# Patient Record
Sex: MALE | Race: WHITE | Employment: FULL TIME | URBAN - METROPOLITAN AREA
[De-identification: names, ages, dates, MRNs, and addresses within clinical notes are randomized per-mention and may not be internally consistent; named-entity substitution may affect disease eponyms.]

---

## 2017-07-08 ENCOUNTER — HOSPITAL ENCOUNTER (OUTPATIENT)
Age: 67
Setting detail: OBSERVATION
Discharge: HOME | End: 2017-07-10
Attending: FAMILY MEDICINE | Admitting: HOSPITALIST
Payer: MEDICARE

## 2017-07-08 ENCOUNTER — APPOINTMENT (OUTPATIENT)
Dept: GENERAL RADIOLOGY | Age: 67
End: 2017-07-08
Attending: FAMILY MEDICINE
Payer: MEDICARE

## 2017-07-08 DIAGNOSIS — R07.9 ACUTE CHEST PAIN: Primary | ICD-10-CM

## 2017-07-08 PROBLEM — I25.10 CAD (CORONARY ARTERY DISEASE): Status: ACTIVE | Noted: 2017-07-08

## 2017-07-08 PROBLEM — R19.7 DIARRHEA: Status: ACTIVE | Noted: 2017-07-08

## 2017-07-08 PROBLEM — I10 HTN (HYPERTENSION): Status: ACTIVE | Noted: 2017-07-08

## 2017-07-08 PROBLEM — F41.9 ANXIETY: Status: ACTIVE | Noted: 2017-07-08

## 2017-07-08 PROBLEM — R74.8 ELEVATED CREATINE KINASE: Status: ACTIVE | Noted: 2017-07-08

## 2017-07-08 LAB
ALBUMIN SERPL BCP-MCNC: 4.5 G/DL (ref 3.4–5)
ALBUMIN/GLOB SERPL: 1.4 {RATIO} (ref 0.8–1.7)
ALP SERPL-CCNC: 67 U/L (ref 45–117)
ALT SERPL-CCNC: 26 U/L (ref 16–61)
ANION GAP BLD CALC-SCNC: 16 MMOL/L (ref 3–18)
AST SERPL W P-5'-P-CCNC: 23 U/L (ref 15–37)
BASOPHILS # BLD AUTO: 0.1 K/UL (ref 0–0.06)
BASOPHILS # BLD: 0 % (ref 0–2)
BILIRUB SERPL-MCNC: 1 MG/DL (ref 0.2–1)
BUN SERPL-MCNC: 8 MG/DL (ref 7–18)
BUN/CREAT SERPL: 6 (ref 12–20)
CALCIUM SERPL-MCNC: 9.4 MG/DL (ref 8.5–10.1)
CHLORIDE SERPL-SCNC: 95 MMOL/L (ref 100–108)
CK MB CFR SERPL CALC: 0.9 % (ref 0–4)
CK MB SERPL-MCNC: 2.4 NG/ML (ref 5–25)
CK SERPL-CCNC: 275 U/L (ref 39–308)
CO2 SERPL-SCNC: 24 MMOL/L (ref 21–32)
CREAT SERPL-MCNC: 1.32 MG/DL (ref 0.6–1.3)
DIFFERENTIAL METHOD BLD: ABNORMAL
EOSINOPHIL # BLD: 0.1 K/UL (ref 0–0.4)
EOSINOPHIL NFR BLD: 1 % (ref 0–5)
ERYTHROCYTE [DISTWIDTH] IN BLOOD BY AUTOMATED COUNT: 13 % (ref 11.6–14.5)
GLOBULIN SER CALC-MCNC: 3.2 G/DL (ref 2–4)
GLUCOSE SERPL-MCNC: 120 MG/DL (ref 74–99)
HCT VFR BLD AUTO: 46 % (ref 36–48)
HGB BLD-MCNC: 16.7 G/DL (ref 13–16)
LIPASE SERPL-CCNC: 180 U/L (ref 73–393)
LYMPHOCYTES # BLD AUTO: 20 % (ref 21–52)
LYMPHOCYTES # BLD: 2.3 K/UL (ref 0.9–3.6)
MAGNESIUM SERPL-MCNC: 1.6 MG/DL (ref 1.6–2.6)
MCH RBC QN AUTO: 32.1 PG (ref 24–34)
MCHC RBC AUTO-ENTMCNC: 36.3 G/DL (ref 31–37)
MCV RBC AUTO: 88.3 FL (ref 74–97)
MONOCYTES # BLD: 0.9 K/UL (ref 0.05–1.2)
MONOCYTES NFR BLD AUTO: 8 % (ref 3–10)
NEUTS SEG # BLD: 7.9 K/UL (ref 1.8–8)
NEUTS SEG NFR BLD AUTO: 71 % (ref 40–73)
PLATELET # BLD AUTO: 274 K/UL (ref 135–420)
PMV BLD AUTO: 10.1 FL (ref 9.2–11.8)
POTASSIUM SERPL-SCNC: 3.5 MMOL/L (ref 3.5–5.5)
PROT SERPL-MCNC: 7.7 G/DL (ref 6.4–8.2)
RBC # BLD AUTO: 5.21 M/UL (ref 4.7–5.5)
SODIUM SERPL-SCNC: 135 MMOL/L (ref 136–145)
TROPONIN I SERPL-MCNC: <0.02 NG/ML (ref 0–0.06)
WBC # BLD AUTO: 11.3 K/UL (ref 4.6–13.2)

## 2017-07-08 PROCEDURE — 74011250637 HC RX REV CODE- 250/637: Performed by: HOSPITALIST

## 2017-07-08 PROCEDURE — 83690 ASSAY OF LIPASE: CPT | Performed by: FAMILY MEDICINE

## 2017-07-08 PROCEDURE — 96375 TX/PRO/DX INJ NEW DRUG ADDON: CPT

## 2017-07-08 PROCEDURE — 93005 ELECTROCARDIOGRAM TRACING: CPT

## 2017-07-08 PROCEDURE — 74011250636 HC RX REV CODE- 250/636: Performed by: FAMILY MEDICINE

## 2017-07-08 PROCEDURE — 99285 EMERGENCY DEPT VISIT HI MDM: CPT

## 2017-07-08 PROCEDURE — 85025 COMPLETE CBC W/AUTO DIFF WBC: CPT | Performed by: FAMILY MEDICINE

## 2017-07-08 PROCEDURE — 99218 HC RM OBSERVATION: CPT

## 2017-07-08 PROCEDURE — 74011250636 HC RX REV CODE- 250/636: Performed by: HOSPITALIST

## 2017-07-08 PROCEDURE — 83735 ASSAY OF MAGNESIUM: CPT | Performed by: FAMILY MEDICINE

## 2017-07-08 PROCEDURE — 80053 COMPREHEN METABOLIC PANEL: CPT | Performed by: FAMILY MEDICINE

## 2017-07-08 PROCEDURE — 96365 THER/PROPH/DIAG IV INF INIT: CPT

## 2017-07-08 PROCEDURE — 71010 XR CHEST PORT: CPT

## 2017-07-08 PROCEDURE — 96372 THER/PROPH/DIAG INJ SC/IM: CPT

## 2017-07-08 PROCEDURE — 82550 ASSAY OF CK (CPK): CPT | Performed by: FAMILY MEDICINE

## 2017-07-08 PROCEDURE — 96374 THER/PROPH/DIAG INJ IV PUSH: CPT

## 2017-07-08 RX ORDER — DIPHENHYDRAMINE HYDROCHLORIDE 50 MG/ML
12.5 INJECTION, SOLUTION INTRAMUSCULAR; INTRAVENOUS
Status: DISCONTINUED | OUTPATIENT
Start: 2017-07-08 | End: 2017-07-10 | Stop reason: HOSPADM

## 2017-07-08 RX ORDER — ROPINIROLE 0.25 MG/1
0.5 TABLET, FILM COATED ORAL
Status: DISCONTINUED | OUTPATIENT
Start: 2017-07-08 | End: 2017-07-10 | Stop reason: HOSPADM

## 2017-07-08 RX ORDER — MAGNESIUM SULFATE HEPTAHYDRATE 40 MG/ML
2 INJECTION, SOLUTION INTRAVENOUS ONCE
Status: COMPLETED | OUTPATIENT
Start: 2017-07-08 | End: 2017-07-08

## 2017-07-08 RX ORDER — SODIUM CHLORIDE 9 MG/ML
75 INJECTION, SOLUTION INTRAVENOUS CONTINUOUS
Status: DISCONTINUED | OUTPATIENT
Start: 2017-07-08 | End: 2017-07-10 | Stop reason: HOSPADM

## 2017-07-08 RX ORDER — ENOXAPARIN SODIUM 100 MG/ML
40 INJECTION SUBCUTANEOUS EVERY 24 HOURS
Status: DISCONTINUED | OUTPATIENT
Start: 2017-07-08 | End: 2017-07-10 | Stop reason: HOSPADM

## 2017-07-08 RX ORDER — TAMSULOSIN HYDROCHLORIDE 0.4 MG/1
0.4 CAPSULE ORAL DAILY
Status: DISCONTINUED | OUTPATIENT
Start: 2017-07-09 | End: 2017-07-10 | Stop reason: HOSPADM

## 2017-07-08 RX ORDER — METOPROLOL SUCCINATE 25 MG/1
25 TABLET, EXTENDED RELEASE ORAL DAILY
COMMUNITY

## 2017-07-08 RX ORDER — MORPHINE SULFATE 2 MG/ML
1 INJECTION, SOLUTION INTRAMUSCULAR; INTRAVENOUS
Status: DISCONTINUED | OUTPATIENT
Start: 2017-07-08 | End: 2017-07-10 | Stop reason: HOSPADM

## 2017-07-08 RX ORDER — ESCITALOPRAM OXALATE 10 MG/1
10 TABLET ORAL DAILY
COMMUNITY

## 2017-07-08 RX ORDER — ESCITALOPRAM OXALATE 10 MG/1
10 TABLET ORAL DAILY
Status: DISCONTINUED | OUTPATIENT
Start: 2017-07-09 | End: 2017-07-10 | Stop reason: HOSPADM

## 2017-07-08 RX ORDER — LORAZEPAM 2 MG/ML
1 INJECTION INTRAMUSCULAR ONCE
Status: COMPLETED | OUTPATIENT
Start: 2017-07-08 | End: 2017-07-08

## 2017-07-08 RX ORDER — CLOPIDOGREL BISULFATE 75 MG/1
75 TABLET ORAL 3 TIMES WEEKLY
Status: DISCONTINUED | OUTPATIENT
Start: 2017-07-10 | End: 2017-07-10 | Stop reason: HOSPADM

## 2017-07-08 RX ORDER — NITROGLYCERIN 0.4 MG/1
0.4 TABLET SUBLINGUAL AS NEEDED
Status: DISCONTINUED | OUTPATIENT
Start: 2017-07-08 | End: 2017-07-10 | Stop reason: HOSPADM

## 2017-07-08 RX ORDER — MULTIVITAMIN WITH IRON
1 TABLET ORAL DAILY
COMMUNITY

## 2017-07-08 RX ORDER — ROPINIROLE 0.5 MG/1
0.5 TABLET, FILM COATED ORAL
COMMUNITY

## 2017-07-08 RX ORDER — NALOXONE HYDROCHLORIDE 0.4 MG/ML
0.4 INJECTION, SOLUTION INTRAMUSCULAR; INTRAVENOUS; SUBCUTANEOUS AS NEEDED
Status: DISCONTINUED | OUTPATIENT
Start: 2017-07-08 | End: 2017-07-10 | Stop reason: HOSPADM

## 2017-07-08 RX ORDER — ATORVASTATIN CALCIUM 10 MG/1
10 TABLET, FILM COATED ORAL DAILY
Status: DISCONTINUED | OUTPATIENT
Start: 2017-07-09 | End: 2017-07-10 | Stop reason: HOSPADM

## 2017-07-08 RX ORDER — ONDANSETRON 2 MG/ML
4 INJECTION INTRAMUSCULAR; INTRAVENOUS
Status: DISCONTINUED | OUTPATIENT
Start: 2017-07-08 | End: 2017-07-10 | Stop reason: HOSPADM

## 2017-07-08 RX ORDER — TAMSULOSIN HYDROCHLORIDE 0.4 MG/1
0.4 CAPSULE ORAL DAILY
COMMUNITY

## 2017-07-08 RX ORDER — CLOPIDOGREL BISULFATE 75 MG/1
75 TABLET ORAL 3 TIMES WEEKLY
COMMUNITY

## 2017-07-08 RX ORDER — ONDANSETRON 2 MG/ML
4 INJECTION INTRAMUSCULAR; INTRAVENOUS ONCE
Status: COMPLETED | OUTPATIENT
Start: 2017-07-08 | End: 2017-07-08

## 2017-07-08 RX ORDER — ASPIRIN 81 MG/1
81 TABLET ORAL DAILY
Status: DISCONTINUED | OUTPATIENT
Start: 2017-07-09 | End: 2017-07-10 | Stop reason: HOSPADM

## 2017-07-08 RX ORDER — METOPROLOL SUCCINATE 25 MG/1
25 TABLET, EXTENDED RELEASE ORAL DAILY
Status: DISCONTINUED | OUTPATIENT
Start: 2017-07-09 | End: 2017-07-10 | Stop reason: HOSPADM

## 2017-07-08 RX ORDER — ACETAMINOPHEN 325 MG/1
650 TABLET ORAL
Status: DISCONTINUED | OUTPATIENT
Start: 2017-07-08 | End: 2017-07-10 | Stop reason: HOSPADM

## 2017-07-08 RX ADMIN — MAGNESIUM SULFATE HEPTAHYDRATE 2 G: 40 INJECTION, SOLUTION INTRAVENOUS at 21:25

## 2017-07-08 RX ADMIN — SODIUM CHLORIDE 75 ML/HR: 900 INJECTION, SOLUTION INTRAVENOUS at 21:24

## 2017-07-08 RX ADMIN — ROPINIROLE HYDROCHLORIDE 0.5 MG: 0.25 TABLET, FILM COATED ORAL at 23:43

## 2017-07-08 RX ADMIN — ONDANSETRON 4 MG: 2 INJECTION INTRAMUSCULAR; INTRAVENOUS at 19:15

## 2017-07-08 RX ADMIN — SODIUM CHLORIDE 1000 ML: 900 INJECTION, SOLUTION INTRAVENOUS at 19:15

## 2017-07-08 RX ADMIN — LORAZEPAM 1 MG: 2 INJECTION INTRAMUSCULAR; INTRAVENOUS at 19:15

## 2017-07-08 RX ADMIN — ENOXAPARIN SODIUM 40 MG: 40 INJECTION SUBCUTANEOUS at 21:25

## 2017-07-08 NOTE — IP AVS SNAPSHOT
[address] [place] [id number] 
[phone] Patient: Cha Herrera Sr. MRN: [id number] MMO:[birth year] You are allergic to the following No active allergies Recent Documentation Height Weight BMI Smoking Status 1.727 m 62.7 kg 21.03 kg/m2 Never Assessed Emergency Contacts Name Discharge Info Relation Home Work Mobile Emily Oreilly DISCHARGE CAREGIVER [3] Child [2]   [phone] Isadora Broussard DISCHARGE CAREGIVER [3] Child [2]   [phone] About your hospitalization You were admitted on:  July 8, 2017 You last received care in the:  [address] [place] [address] You were discharged on:  July 10, 2017 Unit phone number:  [phone] Why you were hospitalized Your primary diagnosis was:  Acute Chest Pain Your diagnoses also included:  Diarrhea, Cad (Coronary Artery Disease), Anxiety, Htn (Hypertension), Elevated Creatine Kinase Providers Seen During Your Hospitalizations Provider Role Specialty Primary office phone Melany Srivastava MD Attending Provider Emergency Medicine [phone] Chandan Calderon MD Attending Provider Internal Medicine [phone] Kay Vergara MD Attending Provider [facility] [phone] Your Primary Care Physician (PCP) Primary Care Physician Office Phone Office Fax Faviola Ruanoumber [phone] Follow-up Information Follow up With Details Comments Contact Info Colt Mcclellan MD On 7/24/2017 Follow-up appointment @ 11:15 a.m. [address] [facility] 150 
[phone] Current Discharge Medication List  
  
CONTINUE these medications which have NOT CHANGED Dose & Instructions Dispensing Information Comments Morning Noon Evening Bedtime DAILY MULTI-VITAMINS/IRON tablet Generic drug:  multivitamin with iron Your last dose was: Your next dose is:    
   
   
 Dose:  1 Tab Take 1 Tab by mouth daily. Refills:  0  
     
   
   
   
  
 FLOMAX 0.4 mg capsule Generic drug:  tamsulosin Your last dose was: Your next dose is:    
   
   
 Dose:  0.4 mg Take 0.4 mg by mouth daily. Refills:  0 LEXAPRO 10 mg tablet Generic drug:  escitalopram oxalate Your last dose was: Your next dose is:    
   
   
 Dose:  10 mg Take 10 mg by mouth daily. Refills:  0  
     
   
   
   
  
 omeprazole 20 mg capsule Commonly known as:  PRILOSEC Your last dose was: Your next dose is:    
   
   
 Dose:  20 mg Take 20 mg by mouth daily. Refills:  0 PLAVIX 75 mg Tab Generic drug:  clopidogrel Your last dose was: Your next dose is:    
   
   
 Dose:  75 mg Take 75 mg by mouth Three (3) times a week. Refills:  0  
     
   
   
   
  
 rOPINIRole 0.5 mg tablet Commonly known as:  Lavena Concepcion Your last dose was: Your next dose is:    
   
   
 Dose:  0.5 mg Take 0.5 mg by mouth nightly. Refills:  0  
     
   
   
   
  
 TOPROL XL 25 mg XL tablet Generic drug:  metoprolol succinate Your last dose was: Your next dose is:    
   
   
 Dose:  25 mg Take 25 mg by mouth daily. Refills:  0 Discharge Instructions DISCHARGE SUMMARY from Nurse The following personal items are in your possession at time of discharge: 
 
Dental Appliances: None Visual Aid: None Home Medications: None Jewelry: Earrings (2 white metal earrings) Clothing: Undergarments, With patient, Footwear, Pants, Shirt Other Valuables: None Personal Items Sent to Safe: none PATIENT INSTRUCTIONS: 
 
After general anesthesia or intravenous sedation, for 24 hours or while taking prescription Narcotics: · Limit your activities · Do not drive and operate hazardous machinery · Do not make important personal or business decisions · Do  not drink alcoholic beverages · If you have not urinated within 8 hours after discharge, please contact your surgeon on call. Report the following to your surgeon: 
· Excessive pain, swelling, redness or odor of or around the surgical area · Temperature over 100.5 · Nausea and vomiting lasting longer than 4 hours or if unable to take medications · Any signs of decreased circulation or nerve impairment to extremity: change in color, persistent  numbness, tingling, coldness or increase pain · Any questions What to do at Home: 
Recommended activity: Activity as tolerated. Please follow up with primary care provider and cardiologist. 
 
 
*  Please give a list of your current medications to your Primary Care Provider. *  Please update this list whenever your medications are discontinued, doses are 
    changed, or new medications (including over-the-counter products) are added. *  Please carry medication information at all times in case of emergency situations. These are general instructions for a healthy lifestyle: No smoking/ No tobacco products/ Avoid exposure to second hand smoke Surgeon General's Warning:  Quitting smoking now greatly reduces serious risk to your health. Obesity, smoking, and sedentary lifestyle greatly increases your risk for illness A healthy diet, regular physical exercise & weight monitoring are important for maintaining a healthy lifestyle You may be retaining fluid if you have a history of heart failure or if you experience any of the following symptoms:  Weight gain of 3 pounds or more overnight or 5 pounds in a week, increased swelling in our hands or feet or shortness of breath while lying flat in bed. Please call your doctor as soon as you notice any of these symptoms; do not wait until your next office visit. Recognize signs and symptoms of STROKE: 
 
F-face looks uneven A-arms unable to move or move unevenly S-speech slurred or non-existent T-time-call 911 as soon as signs and symptoms begin-DO NOT go Back to bed or wait to see if you get better-TIME IS BRAIN. Warning Signs of HEART ATTACK Call 911 if you have these symptoms: 
? Chest discomfort. Most heart attacks involve discomfort in the center of the chest that lasts more than a few minutes, or that goes away and comes back. It can feel like uncomfortable pressure, squeezing, fullness, or pain. ? Discomfort in other areas of the upper body. Symptoms can include pain or discomfort in one or both arms, the back, neck, jaw, or stomach. ? Shortness of breath with or without chest discomfort. ? Other signs may include breaking out in a cold sweat, nausea, or lightheadedness. Don't wait more than five minutes to call [address]! Fast action can save your life. Calling 911 is almost always the fastest way to get lifesaving treatment. Emergency Medical Services staff can begin treatment when they arrive  up to an hour sooner than if someone gets to the hospital by car. The discharge information has been reviewed with the patient and caregiver. The patient and caregiver verbalized understanding. Discharge medications reviewed with the patient and guardian and appropriate educational materials and side effects teaching were provided. Patient armband removed and shredded Discharge Instructions Attachments/References ANXIETY DISORDER (ENGLISH) CHEST PAIN (ENGLISH) Discharge Orders None [facility] [place] Announcement We are excited to announce that we are making your provider's discharge notes available to you in Eye Phone. You will see these notes when they are completed and signed by the physician that discharged you from your recent hospital stay.   If you have any questions or concerns about any information you see in Eye Phone, please call the [facility] [employer] Department where you were seen or reach out to your Primary Care Provider for more information about your plan of care. Introducing [facility]! [facility] introduces Rivalry patient portal. Now you can access parts of your medical record, email your doctor's office, and request medication refills online. 1. In your internet browser, go to [email or web address] 2. Click on the First Time User? Click Here link in the Sign In box. You will see the New Member Sign Up page. 3. Enter your Rivalry Access Code exactly as it appears below. You will not need to use this code after youve completed the sign-up process. If you do not sign up before the expiration date, you must request a new code. · Rivalry Access Code: [email or web address] Expires: 10/6/2017  7:10 PM 
 
4. Enter the last four digits of your Social Security Number (xxxx) and Date of Birth (mm/dd/yyyy) as indicated and click Submit. You will be taken to the next sign-up page. 5. Create a Rivalry ID. This will be your Rivalry login ID and cannot be changed, so think of one that is secure and easy to remember. 6. Create a Rivalry password. You can change your password at any time. 7. Enter your Password Reset Question and Answer. This can be used at a later time if you forget your password. 8. Enter your e-mail address. You will receive e-mail notification when new information is available in [address] 9. Click Sign Up. You can now view and download portions of your medical record. 10. Click the Download Summary menu link to download a portable copy of your medical information. If you have questions, please visit the Frequently Asked Questions section of the Rivalry website. Remember, Rivalry is NOT to be used for urgent needs. For medical emergencies, dial 911. Now available from your iPhone and Android! General Information Please provide this summary of care documentation to your next provider. Patient Signature:  ____________________________________________________________ Date:  ____________________________________________________________  
  
Gwendloyn Finger Provider Signature:  ____________________________________________________________ Date:  ____________________________________________________________ More Information Anxiety Disorder: Care Instructions Your Care Instructions Anxiety is a normal reaction to stress. Difficult situations can cause you to have symptoms such as sweaty palms and a nervous feeling. In an anxiety disorder, the symptoms are far more severe. Constant worry, muscle tension, trouble sleeping, nausea and diarrhea, and other symptoms can make normal daily activities difficult or impossible. These symptoms may occur for no reason, and they can affect your work, school, or social life. Medicines, counseling, and self-care can all help. Follow-up care is a key part of your treatment and safety. Be sure to make and go to all appointments, and call your doctor if you are having problems. It's also a good idea to know your test results and keep a list of the medicines you take. How can you care for yourself at home? · Take medicines exactly as directed. Call your doctor if you think you are having a problem with your medicine. · Go to your counseling sessions and follow-up appointments. · Recognize and accept your anxiety. Then, when you are in a situation that makes you anxious, say to yourself, \"This is not an emergency. I feel uncomfortable, but I am not in danger. I can keep going even if I feel anxious. \" · Be kind to your body: ¨ Relieve tension with exercise or a massage. ¨ Get enough rest. 
¨ Avoid alcohol, caffeine, nicotine, and illegal drugs. They can increase your anxiety level and cause sleep problems. ¨ Learn and do relaxation techniques. See below for more about these techniques. · Engage your mind. Get out and do something you enjoy. Go to a funny movie, or take a walk or hike. Plan your day. Having too much or too little to do can make you anxious. · Keep a record of your symptoms. Discuss your fears with a good friend or family member, or join a support group for people with similar problems. Talking to others sometimes relieves stress. · Get involved in social groups, or volunteer to help others. Being alone sometimes makes things seem worse than they are. · Get at least 30 minutes of exercise on most days of the week to relieve stress. Walking is a good choice. You also may want to do other activities, such as running, swimming, cycling, or playing tennis or team sports. Relaxation techniques Do relaxation exercises 10 to 20 minutes a day. You can play soothing, relaxing music while you do them, if you wish. · Tell others in your house that you are going to do your relaxation exercises. Ask them not to disturb you. · Find a comfortable place, away from all distractions and noise. · Lie down on your back, or sit with your back straight. · Focus on your breathing. Make it slow and steady. · Breathe in through your nose. Breathe out through either your nose or mouth. · Breathe deeply, filling up the area between your navel and your rib cage. Breathe so that your belly goes up and down. · Do not hold your breath. · Breathe like this for 5 to 10 minutes. Notice the feeling of calmness throughout your whole body. As you continue to breathe slowly and deeply, relax by doing the following for another 5 to 10 minutes: · Tighten and relax each muscle group in your body. You can begin at your toes and work your way up to your head. · Imagine your muscle groups relaxing and becoming heavy. · Empty your mind of all thoughts. · Let yourself relax more and more deeply. · Become aware of the state of calmness that surrounds you. · When your relaxation time is over, you can bring yourself back to alertness by moving your fingers and toes and then your hands and feet and then stretching and moving your entire body. Sometimes people fall asleep during relaxation, but they usually wake up shortly afterward. · Always give yourself time to return to full alertness before you drive a car or do anything that might cause an accident if you are not fully alert. Never play a relaxation tape while you drive a car. When should you call for help? Call 911 anytime you think you may need emergency care. For example, call if: 
· You feel you cannot stop from hurting yourself or someone else. Keep the numbers for these national suicide hotlines: 3-610-161-TALK (4-348.955.6825) and 5-515-RVUQBDG (2-690.190.5839). If you or someone you know talks about suicide or feeling hopeless, get help right away. Watch closely for changes in your health, and be sure to contact your doctor if: 
· You have anxiety or fear that affects your life. · You have symptoms of anxiety that are new or different from those you had before. Where can you learn more? Go to http://xavier-cammy.info/. Enter P754 in the search box to learn more about \"Anxiety Disorder: Care Instructions. \" Current as of: July 26, 2016 Content Version: 11.3 © 2701-3944 [employer]. Care instructions adapted under license by [employer] (which disclaims liability or warranty for this information). If you have questions about a medical condition or this instruction, always ask your healthcare professional. [place] [address] any warranty or liability for your use of this information. Chest Pain: Care Instructions Your Care Instructions There are many things that can cause chest pain.  Some are not serious and will get better on their own in a few days. But some kinds of chest pain need more testing and treatment. Your doctor may have recommended a follow-up visit in the next 8 to 12 hours. If you are not getting better, you may need more tests or treatment. Even though your doctor has released you, you still need to watch for any problems. The doctor carefully checked you, but sometimes problems can develop later. If you have new symptoms or if your symptoms do not get better, get medical care right away. If you have worse or different chest pain or pressure that lasts more than 5 minutes or you passed out (lost consciousness), call 911 or seek other emergency help right away. A medical visit is only one step in your treatment. Even if you feel better, you still need to do what your doctor recommends, such as going to all suggested follow-up appointments and taking medicines exactly as directed. This will help you recover and help prevent future problems. How can you care for yourself at home? · Rest until you feel better. · Take your medicine exactly as prescribed. Call your doctor if you think you are having a problem with your medicine. · Do not drive after taking a prescription pain medicine. When should you call for help? Call 911 if: 
· You passed out (lost consciousness). · You have severe difficulty breathing. · You have symptoms of a heart attack. These may include: ¨ Chest pain or pressure, or a strange feeling in your chest. 
¨ Sweating. ¨ Shortness of breath. ¨ Nausea or vomiting. ¨ Pain, pressure, or a strange feeling in your back, neck, jaw, or upper belly or in one or both shoulders or arms. ¨ Lightheadedness or sudden weakness. ¨ A fast or irregular heartbeat. After you call 911, the  may tell you to chew 1 adult-strength or 2 to 4 low-dose aspirin. Wait for an ambulance. Do not try to drive yourself. Call your doctor today if: 
· You have any trouble breathing. · Your chest pain gets worse. · You are dizzy or lightheaded, or you feel like you may faint. · You are not getting better as expected. · You are having new or different chest pain. Where can you learn more? Go to http://xavier-cammy.info/. Enter A120 in the search box to learn more about \"Chest Pain: Care Instructions. \" Current as of: March 20, 2017 Content Version: 11.3 © 2602-8525 [employer]. Care instructions adapted under license by [employer] (which disclaims liability or warranty for this information). If you have questions about a medical condition or this instruction, always ask your healthcare professional. [address] any warranty or liability for your use of this information.

## 2017-07-08 NOTE — ED PROVIDER NOTES
[address]  EMERGENCY DEPARTMENT HISTORY AND PHYSICAL EXAM       Date: 7/8/2017   Patient Name: Dunia Renee. YOB: 1950  Medical Record Number: [id number]    History of Presenting Illness     Chief Complaint   Patient presents with    Chest Pain        History Provided By:  Patient and EMS    Additional History:   6:10 PM  Dunia Breen is a 77 y.o. male with PMHX anxiety and MI (7 years ago) presenting to the ED via EMS C/O gradually worsening pressure-like nonradiating sternal CP, rated 9/10 at onset and rated 5/10 in the ED, onset 20 minutes ago while lying in bed. Took 2 Nitro with minimal relief. Associated sxs include n/v. Pt states his sxs are not similar to his previous MI sxs and that his pain today is worse. Per EMS, pt was given 2 Nitro and 324 mg of ASA with good relief of pain. Initial /80. Currently on Toprol, Plavix, Lexapro, Flomax. Pt denies SOB, abd pain, and any other sxs or complaints. Primary Care Provider: Tristen Romano MD   Specialist:    Past History     Past Medical History:   Past Medical History:   Diagnosis Date    Anxiety     CAD (coronary artery disease)     MI 2010    Hypercholesterolemia     Psychiatric disorder     anxiety        Past Surgical History:   History reviewed. No pertinent surgical history. Family History:   History reviewed. No pertinent family history. Social History:   Social History   Substance Use Topics    Smoking status: None    Smokeless tobacco: None    Alcohol use None        Allergies:   No Known Allergies     Review of Systems   Review of Systems   Constitutional: Negative for fatigue and fever. HENT: Negative for rhinorrhea and sore throat. Respiratory: Negative for cough and shortness of breath. Cardiovascular: Positive for chest pain. Negative for palpitations. Gastrointestinal: Positive for nausea and vomiting. Negative for abdominal pain and diarrhea.    Genitourinary: Negative for difficulty urinating and dysuria. Musculoskeletal: Negative for arthralgias and myalgias. Skin: Negative for color change and rash. Neurological: Negative for light-headedness and headaches. Physical Exam  Vitals:    07/08/17 1830 07/08/17 1845 07/08/17 1900 07/08/17 1915   BP: 157/63 142/75 144/80 149/76   Pulse: (!) 57 61 67 74   Resp: 25 26 17 22   Temp:       SpO2: 98% 99% 100% 100%   Weight:       Height:           Physical Exam   Nursing note and vitals reviewed. Vital signs and nursing notes reviewed    CONSTITUTIONAL: Alert, in no apparent distress; well-developed; well-nourished. Middle aged cool, clammy, dry heaving. HEAD:  Normocephalic, atraumatic  EYES: PERRL; EOM's intact. ENTM: Nose: no rhinorrhea; Throat: no erythema or exudate, mucous membranes moist  Neck:  No JVD, supple without lymphadenopathy  RESP: Chest clear, equal breath sounds. CV: S1 and S2 WNL; No murmurs, gallops or rubs. GI: Normal bowel sounds, abdomen soft and non-tender. No masses or organomegaly. : No costo-vertebral angle tenderness. BACK:  Non-tender  UPPER EXT:  Normal inspection  LOWER EXT: No edema, no calf tenderness. Distal pulses intact. NEURO: CN intact, reflexes 2/4 and sym, strength 5/5 and sym, sensation intact. SKIN: No rashes; Normal for age and stage. PSYCH:  Alert and oriented, normal affect.     Diagnostic Study Results     Labs -      Recent Results (from the past 12 hour(s))   EKG, 12 LEAD, INITIAL    Collection Time: 07/08/17  6:12 PM   Result Value Ref Range    Ventricular Rate 74 BPM    Atrial Rate 74 BPM    P-R Interval 172 ms    QRS Duration 82 ms    Q-T Interval 474 ms    QTC Calculation (Bezet) 526 ms    Calculated P Axis 65 degrees    Calculated R Axis 12 degrees    Calculated T Axis 23 degrees    Diagnosis       Sinus rhythm with marked sinus arrhythmia with frequent premature ventricular   complexes  Nonspecific ST and T wave abnormality  Prolonged QT  Abnormal ECG  No previous ECGs available     CBC WITH AUTOMATED DIFF    Collection Time: 07/08/17  6:30 PM   Result Value Ref Range    WBC 11.3 4.6 - 13.2 K/uL    RBC 5.21 4.70 - 5.50 M/uL    HGB 16.7 (H) 13.0 - 16.0 g/dL    HCT 46.0 36.0 - 48.0 %    MCV 88.3 74.0 - 97.0 FL    MCH 32.1 24.0 - 34.0 PG    MCHC 36.3 31.0 - 37.0 g/dL    RDW 13.0 11.6 - 14.5 %    PLATELET 979 800 - 708 K/uL    MPV 10.1 9.2 - 11.8 FL    NEUTROPHILS 71 40 - 73 %    LYMPHOCYTES 20 (L) 21 - 52 %    MONOCYTES 8 3 - 10 %    EOSINOPHILS 1 0 - 5 %    BASOPHILS 0 0 - 2 %    ABS. NEUTROPHILS 7.9 1.8 - 8.0 K/UL    ABS. LYMPHOCYTES 2.3 0.9 - 3.6 K/UL    ABS. MONOCYTES 0.9 0.05 - 1.2 K/UL    ABS. EOSINOPHILS 0.1 0.0 - 0.4 K/UL    ABS. BASOPHILS 0.1 (H) 0.0 - 0.06 K/UL    DF AUTOMATED     METABOLIC PANEL, COMPREHENSIVE    Collection Time: 07/08/17  6:30 PM   Result Value Ref Range    Sodium 135 (L) 136 - 145 mmol/L    Potassium 3.5 3.5 - 5.5 mmol/L    Chloride 95 (L) 100 - 108 mmol/L    CO2 24 21 - 32 mmol/L    Anion gap 16 3.0 - 18 mmol/L    Glucose 120 (H) 74 - 99 mg/dL    BUN 8 7.0 - 18 MG/DL    Creatinine 1.32 (H) 0.6 - 1.3 MG/DL    BUN/Creatinine ratio 6 (L) 12 - 20      GFR est AA >60 >60 ml/min/1.73m2    GFR est non-AA 54 (L) >60 ml/min/1.73m2    Calcium 9.4 8.5 - 10.1 MG/DL    Bilirubin, total 1.0 0.2 - 1.0 MG/DL    ALT (SGPT) 26 16 - 61 U/L    AST (SGOT) 23 15 - 37 U/L    Alk.  phosphatase 67 45 - 117 U/L    Protein, total 7.7 6.4 - 8.2 g/dL    Albumin 4.5 3.4 - 5.0 g/dL    Globulin 3.2 2.0 - 4.0 g/dL    A-G Ratio 1.4 0.8 - 1.7     MAGNESIUM    Collection Time: 07/08/17  6:30 PM   Result Value Ref Range    Magnesium 1.6 1.6 - 2.6 mg/dL   LIPASE    Collection Time: 07/08/17  6:30 PM   Result Value Ref Range    Lipase 180 73 - 393 U/L   CARDIAC PANEL,(CK, CKMB & TROPONIN)    Collection Time: 07/08/17  6:30 PM   Result Value Ref Range     39 - 308 U/L    CK - MB 2.4 <3.6 ng/ml    CK-MB Index 0.9 0.0 - 4.0 %    Troponin-I, Qt. <0.02 0.00 - 0.06 NG/ML Radiologic Studies -  7:33 PM  RADIOLOGY FINDINGS  Chest X-ray shows NAP  Pending review by Radiologist  Recorded by Aroldo Alcantara [facility] [employer], as dictated by Brian Park MD     XR CHEST PORT    (Results Pending)        Medical Decision Making   I am the first provider for this patient. I reviewed the vital signs, available nursing notes, past medical history, past surgical history, family history and social history. Vital Signs-Reviewed the patient's vital signs. Patient Vitals for the past 12 hrs:   Temp Pulse Resp BP SpO2   07/08/17 1915 - 74 22 149/76 100 %   07/08/17 1900 - 67 17 144/80 100 %   07/08/17 1845 - 61 26 142/75 99 %   07/08/17 1830 - (!) 57 25 157/63 98 %   07/08/17 1822 97.3 °F (36.3 °C) 77 20 150/80 99 %   07/08/17 1815 - 74 24 150/80 100 %       Pulse Oximetry Analysis - Normal 99% on RA. No intervention needed. Cardiac Monitor:   Rate: 60 bpm  Rhythm: Normal Sinus Rhythm     EKG interpretation: (EMS)  5:50 PM  73 bpm, sinus rhythm with frequent PVCs, prolonged QT interval  EKG read by Brian Park MD at 6:00 PM    EKG interpretation: (Preliminary)  6:12 PM   74 bpm, sinus rhythm with marked sinus arrhythmia with frequent PVCs, nonspecific ST and T wave abnormality, prolonged QT  EKG read by Brian Park MD      Provider Notes:    INITIAL CLINICAL IMPRESSION and PLANS:  The patient presents with the primary complaint(s) of: CP. The presentation, to include historical aspects and clinical findings are consistent with the DX of ACS. However, other possible DX's to consider as primary, associated with, or exacerbated by include: GERD, gastritis, pancreatitis    Considering the above, my initial management plan to evaluate and therapeutic interventions include the following and as noted in the orders:    1. Labs: CBC, CMP, magnesium, lipase  2. Imaging: CXR, EKG     ED Course:     6:10 PM Initial assessment performed.      7:25 PM Discussed patient's history, exam, and available diagnostics results with Ana Rsosi MD, internal medicine, who agrees to admit pt to Telemetry. Medications Given in the ED:  Medications   sodium chloride 0.9 % bolus infusion 1,000 mL (1,000 mL IntraVENous New Bag 7/8/17 1915)   ondansetron (ZOFRAN) injection 4 mg (4 mg IntraVENous Given 7/8/17 1915)   LORazepam (ATIVAN) injection 1 mg (1 mg IntraVENous Given 7/8/17 1915)        7:25 PM  Patient is being admitted to the hospital by Ana Rossi MD. The results of their tests and reasons for their admission have been discussed with them and/or available family. They convey agreement and understanding for the need to be admitted and for their admission diagnosis. CONDITIONS ON ADMISSION:  Deep Vein Thrombosis is not present at the time of admission. Thrombosis is not present at the time of admission. Pneumonia is not present at the time of admission. MRSA is not present at the time of admission. Wound infection is not present at the time of admission. Pressure Ulcer is not present at the time of admission. Diagnosis   Clinical Impression:   1. Acute chest pain         Discussion:  Middle aged male presented with CP, substernal , occurred while at rest. Took nitro without relief. Called EMS. Nonspecific changes on EKG. 1st set of cardiac enzymes negative. Pt will be admitted to Telemetry for further CP r/o.     _______________________________   Attestations:     SCRIBE ATTESTATION:  This note is prepared by Ricci Reinoso, acting as Scribe for Marlys Barnett MD.    PROVIDER ATTESTATION:  Marlys Barnett MD: The scribe's documentation has been prepared under my direction and personally reviewed by me in its entirety.  I confirm that the note above accurately reflects all work, treatment, procedures, and medical decision making performed by me.   _______________________________

## 2017-07-08 NOTE — ED TRIAGE NOTES
Pt with sudden onset chest pain, feeling cold, and diaphoretic on medics arrival;  Pt with prior MI in 2010; Pt took 2 of his own NTG without relief, given 2 NTG by medics and 324 ASA without relief;   On arrival pt shivering, c/o nausea and needing something for anxiety

## 2017-07-09 LAB
ANION GAP BLD CALC-SCNC: 7 MMOL/L (ref 3–18)
BASOPHILS # BLD AUTO: 0 K/UL (ref 0–0.06)
BASOPHILS # BLD: 0 % (ref 0–2)
BUN SERPL-MCNC: 9 MG/DL (ref 7–18)
BUN/CREAT SERPL: 9 (ref 12–20)
CALCIUM SERPL-MCNC: 8.3 MG/DL (ref 8.5–10.1)
CHLORIDE SERPL-SCNC: 98 MMOL/L (ref 100–108)
CHOLEST SERPL-MCNC: 149 MG/DL
CK MB CFR SERPL CALC: 0.8 % (ref 0–4)
CK MB CFR SERPL CALC: 0.9 % (ref 0–4)
CK MB SERPL-MCNC: 1.7 NG/ML (ref 5–25)
CK MB SERPL-MCNC: 1.7 NG/ML (ref 5–25)
CK SERPL-CCNC: 194 U/L (ref 39–308)
CK SERPL-CCNC: 210 U/L (ref 39–308)
CO2 SERPL-SCNC: 28 MMOL/L (ref 21–32)
CREAT SERPL-MCNC: 0.96 MG/DL (ref 0.6–1.3)
DIFFERENTIAL METHOD BLD: ABNORMAL
EOSINOPHIL # BLD: 0 K/UL (ref 0–0.4)
EOSINOPHIL NFR BLD: 0 % (ref 0–5)
ERYTHROCYTE [DISTWIDTH] IN BLOOD BY AUTOMATED COUNT: 13.2 % (ref 11.6–14.5)
GLUCOSE SERPL-MCNC: 108 MG/DL (ref 74–99)
HCT VFR BLD AUTO: 39.5 % (ref 36–48)
HDLC SERPL-MCNC: 45 MG/DL (ref 40–60)
HDLC SERPL: 3.3 {RATIO} (ref 0–5)
HGB BLD-MCNC: 14 G/DL (ref 13–16)
LDLC SERPL CALC-MCNC: 81.6 MG/DL (ref 0–100)
LIPID PROFILE,FLP: NORMAL
LYMPHOCYTES # BLD AUTO: 24 % (ref 21–52)
LYMPHOCYTES # BLD: 2.2 K/UL (ref 0.9–3.6)
MAGNESIUM SERPL-MCNC: 2.4 MG/DL (ref 1.6–2.6)
MCH RBC QN AUTO: 31.5 PG (ref 24–34)
MCHC RBC AUTO-ENTMCNC: 35.4 G/DL (ref 31–37)
MCV RBC AUTO: 89 FL (ref 74–97)
MONOCYTES # BLD: 0.6 K/UL (ref 0.05–1.2)
MONOCYTES NFR BLD AUTO: 6 % (ref 3–10)
NEUTS SEG # BLD: 6.6 K/UL (ref 1.8–8)
NEUTS SEG NFR BLD AUTO: 70 % (ref 40–73)
PLATELET # BLD AUTO: 250 K/UL (ref 135–420)
PMV BLD AUTO: 10 FL (ref 9.2–11.8)
POTASSIUM SERPL-SCNC: 3.8 MMOL/L (ref 3.5–5.5)
RBC # BLD AUTO: 4.44 M/UL (ref 4.7–5.5)
SODIUM SERPL-SCNC: 133 MMOL/L (ref 136–145)
TRIGL SERPL-MCNC: 112 MG/DL (ref ?–150)
TROPONIN I SERPL-MCNC: <0.02 NG/ML (ref 0–0.06)
TROPONIN I SERPL-MCNC: <0.02 NG/ML (ref 0–0.06)
VLDLC SERPL CALC-MCNC: 22.4 MG/DL
WBC # BLD AUTO: 9.4 K/UL (ref 4.6–13.2)

## 2017-07-09 PROCEDURE — 96375 TX/PRO/DX INJ NEW DRUG ADDON: CPT

## 2017-07-09 PROCEDURE — 83735 ASSAY OF MAGNESIUM: CPT | Performed by: HOSPITALIST

## 2017-07-09 PROCEDURE — 82550 ASSAY OF CK (CPK): CPT | Performed by: HOSPITALIST

## 2017-07-09 PROCEDURE — 74011250636 HC RX REV CODE- 250/636: Performed by: HOSPITALIST

## 2017-07-09 PROCEDURE — 99218 HC RM OBSERVATION: CPT

## 2017-07-09 PROCEDURE — 80048 BASIC METABOLIC PNL TOTAL CA: CPT | Performed by: HOSPITALIST

## 2017-07-09 PROCEDURE — 36415 COLL VENOUS BLD VENIPUNCTURE: CPT | Performed by: HOSPITALIST

## 2017-07-09 PROCEDURE — 80061 LIPID PANEL: CPT | Performed by: HOSPITALIST

## 2017-07-09 PROCEDURE — C9113 INJ PANTOPRAZOLE SODIUM, VIA: HCPCS | Performed by: HOSPITALIST

## 2017-07-09 PROCEDURE — 74011250637 HC RX REV CODE- 250/637: Performed by: HOSPITALIST

## 2017-07-09 PROCEDURE — 85025 COMPLETE CBC W/AUTO DIFF WBC: CPT | Performed by: HOSPITALIST

## 2017-07-09 PROCEDURE — 74011000250 HC RX REV CODE- 250: Performed by: HOSPITALIST

## 2017-07-09 RX ORDER — OMEPRAZOLE 20 MG/1
20 CAPSULE, DELAYED RELEASE ORAL DAILY
COMMUNITY

## 2017-07-09 RX ADMIN — METOPROLOL SUCCINATE 25 MG: 25 TABLET, EXTENDED RELEASE ORAL at 09:14

## 2017-07-09 RX ADMIN — ATORVASTATIN CALCIUM 10 MG: 10 TABLET, FILM COATED ORAL at 09:15

## 2017-07-09 RX ADMIN — ROPINIROLE HYDROCHLORIDE 0.5 MG: 0.25 TABLET, FILM COATED ORAL at 22:56

## 2017-07-09 RX ADMIN — TAMSULOSIN HYDROCHLORIDE 0.4 MG: 0.4 CAPSULE ORAL at 09:15

## 2017-07-09 RX ADMIN — ESCITALOPRAM OXALATE 10 MG: 10 TABLET ORAL at 09:15

## 2017-07-09 RX ADMIN — SODIUM CHLORIDE 40 MG: 9 INJECTION INTRAMUSCULAR; INTRAVENOUS; SUBCUTANEOUS at 09:16

## 2017-07-09 RX ADMIN — ASPIRIN 81 MG: 81 TABLET, COATED ORAL at 09:15

## 2017-07-09 RX ADMIN — MULTIPLE VITAMINS W/ MINERALS TAB 1 TABLET: TAB at 09:15

## 2017-07-09 RX ADMIN — DIPHENHYDRAMINE HYDROCHLORIDE 12.5 MG: 50 INJECTION, SOLUTION INTRAMUSCULAR; INTRAVENOUS at 22:55

## 2017-07-09 NOTE — CONSULTS
Cardiolology  Inpatient Consult      Patient: Nini Lindsay Sr.               Sex: male          DOA: 7/8/2017       YOB: 1950      Age:  77 y.o.        LOS:  LOS: 0 days      Nini Lindsay Sr. is a 77 y.o. male admitted for Acute chest pain     Recommendations:  · Continue current measures  · Echo has already been ordered  · Nuclear stress test     Impression:  · Chest pain with no evidence of ACS  · Known CAD  · Anxiety  · Restless legs    Patient Active Problem List    Diagnosis Date Noted    Acute chest pain 07/08/2017    Diarrhea 07/08/2017    CAD (coronary artery disease) 07/08/2017    Anxiety 07/08/2017    HTN (hypertension) 07/08/2017    Elevated creatine kinase 07/08/2017      Nadeem Muniz MD  Past Medical History:   Diagnosis Date    Anxiety     CAD (coronary artery disease)     MI 2010    Hypercholesterolemia     Psychiatric disorder     anxiety      History reviewed. No pertinent surgical history. No Known Allergies   History reviewed. No pertinent family history.    Current Facility-Administered Medications   Medication Dose Route Frequency    multivitamin, tx-iron-ca-min (THERA-M w/ IRON) tablet 1 Tab  1 Tab Oral DAILY    0.9% sodium chloride infusion  75 mL/hr IntraVENous CONTINUOUS    acetaminophen (TYLENOL) tablet 650 mg  650 mg Oral Q4H PRN    naloxone (NARCAN) injection 0.4 mg  0.4 mg IntraVENous PRN    diphenhydrAMINE (BENADRYL) injection 12.5 mg  12.5 mg IntraVENous Q4H PRN    ondansetron (ZOFRAN) injection 4 mg  4 mg IntraVENous Q4H PRN    enoxaparin (LOVENOX) injection 40 mg  40 mg SubCUTAneous Q24H    morphine injection 1 mg  1 mg IntraVENous Q3H PRN    metoprolol succinate (TOPROL-XL) XL tablet 25 mg  25 mg Oral DAILY    [START ON 7/10/2017] clopidogrel (PLAVIX) tablet 75 mg  75 mg Oral 3 times weekly    escitalopram oxalate (LEXAPRO) tablet 10 mg  10 mg Oral DAILY    tamsulosin (FLOMAX) capsule 0.4 mg  0.4 mg Oral DAILY    rOPINIRole (REQUIP) tablet 0.5 mg  0.5 mg Oral QHS    pantoprazole (PROTONIX) 40 mg in sodium chloride 0.9 % 10 mL injection  40 mg IntraVENous DAILY    aspirin delayed-release tablet 81 mg  81 mg Oral DAILY    atorvastatin (LIPITOR) tablet 10 mg  10 mg Oral DAILY    nitroglycerin (NITROSTAT) tablet 0.4 mg  0.4 mg SubLINGual PRN         Review of Symptoms:  A comprehensive review of systems was negative except for that written in the HPI. Subjective:  Vishal Blunt is a 77 y.o. male who hx of  MI , anxiety, Cad, HTN  Came to ED due to chest pain at rest today. Located in the middle of chest ,pressure like. Reported having three times diarrhea today, with nausea, no abdomen pain. He was given nitro and got some relief. He was given ativan in ED as well. He has been stable overnight and there is no evidence of acute coronary syndrome for his cardiac markers. He complains of restless leg syndrome and a lot of difficulty with anxiety. He is chest pain free and hemodynamically stable. Cardiac risk factors: known CAD. Physical Exam    Visit Vitals    /84 (BP 1 Location: Left arm, BP Patient Position: At rest)    Pulse (!) 54    Temp 98.3 °F (36.8 °C)    Resp 20    Ht 5' 8\" (1.727 m)    Wt 66.2 kg (145 lb 15.1 oz)    SpO2 97%    BMI 22.19 kg/m2       General Appearance:  Well developed, well nourished,alert and oriented x 3, and individual in no acute distress. Ears/Nose/Mouth/Throat:   Hearing grossly normal.         Neck: Supple. Chest:   Lungs clear to auscultation bilaterally. Cardiovascular:  Regular rate and rhythm, S1, S2 normal, no murmur. Abdomen:   Soft, non-tender, bowel sounds are active. Extremities: No edema bilaterally. Skin: Warm and dry.      Cardiographics    Telemetry: normal sinus rhythm  ECG: No acute ischemic changes  Echocardiogram: Not done    Recent radiology, intake/output and wt reviewed    Labs:   Recent Results (from the past 48 hour(s))   EKG, 12 LEAD, INITIAL Collection Time: 07/08/17  6:12 PM   Result Value Ref Range    Ventricular Rate 74 BPM    Atrial Rate 74 BPM    P-R Interval 172 ms    QRS Duration 82 ms    Q-T Interval 474 ms    QTC Calculation (Bezet) 526 ms    Calculated P Axis 65 degrees    Calculated R Axis 12 degrees    Calculated T Axis 23 degrees    Diagnosis       Sinus rhythm with marked sinus arrhythmia with frequent premature ventricular   complexes  Nonspecific ST and T wave abnormality  Prolonged QT  Abnormal ECG  No previous ECGs available     CBC WITH AUTOMATED DIFF    Collection Time: 07/08/17  6:30 PM   Result Value Ref Range    WBC 11.3 4.6 - 13.2 K/uL    RBC 5.21 4.70 - 5.50 M/uL    HGB 16.7 (H) 13.0 - 16.0 g/dL    HCT 46.0 36.0 - 48.0 %    MCV 88.3 74.0 - 97.0 FL    MCH 32.1 24.0 - 34.0 PG    MCHC 36.3 31.0 - 37.0 g/dL    RDW 13.0 11.6 - 14.5 %    PLATELET 896 673 - 711 K/uL    MPV 10.1 9.2 - 11.8 FL    NEUTROPHILS 71 40 - 73 %    LYMPHOCYTES 20 (L) 21 - 52 %    MONOCYTES 8 3 - 10 %    EOSINOPHILS 1 0 - 5 %    BASOPHILS 0 0 - 2 %    ABS. NEUTROPHILS 7.9 1.8 - 8.0 K/UL    ABS. LYMPHOCYTES 2.3 0.9 - 3.6 K/UL    ABS. MONOCYTES 0.9 0.05 - 1.2 K/UL    ABS. EOSINOPHILS 0.1 0.0 - 0.4 K/UL    ABS. BASOPHILS 0.1 (H) 0.0 - 0.06 K/UL    DF AUTOMATED     METABOLIC PANEL, COMPREHENSIVE    Collection Time: 07/08/17  6:30 PM   Result Value Ref Range    Sodium 135 (L) 136 - 145 mmol/L    Potassium 3.5 3.5 - 5.5 mmol/L    Chloride 95 (L) 100 - 108 mmol/L    CO2 24 21 - 32 mmol/L    Anion gap 16 3.0 - 18 mmol/L    Glucose 120 (H) 74 - 99 mg/dL    BUN 8 7.0 - 18 MG/DL    Creatinine 1.32 (H) 0.6 - 1.3 MG/DL    BUN/Creatinine ratio 6 (L) 12 - 20      GFR est AA >60 >60 ml/min/1.73m2    GFR est non-AA 54 (L) >60 ml/min/1.73m2    Calcium 9.4 8.5 - 10.1 MG/DL    Bilirubin, total 1.0 0.2 - 1.0 MG/DL    ALT (SGPT) 26 16 - 61 U/L    AST (SGOT) 23 15 - 37 U/L    Alk.  phosphatase 67 45 - 117 U/L    Protein, total 7.7 6.4 - 8.2 g/dL    Albumin 4.5 3.4 - 5.0 g/dL    Globulin 3.2 2.0 - 4.0 g/dL    A-G Ratio 1.4 0.8 - 1.7     MAGNESIUM    Collection Time: 07/08/17  6:30 PM   Result Value Ref Range    Magnesium 1.6 1.6 - 2.6 mg/dL   LIPASE    Collection Time: 07/08/17  6:30 PM   Result Value Ref Range    Lipase 180 73 - 393 U/L   CARDIAC PANEL,(CK, CKMB & TROPONIN)    Collection Time: 07/08/17  6:30 PM   Result Value Ref Range     39 - 308 U/L    CK - MB 2.4 <3.6 ng/ml    CK-MB Index 0.9 0.0 - 4.0 %    Troponin-I, Qt. <0.02 0.00 - 0.06 NG/ML   MAGNESIUM    Collection Time: 07/09/17  2:13 AM   Result Value Ref Range    Magnesium 2.4 1.6 - 2.6 mg/dL   CBC WITH AUTOMATED DIFF    Collection Time: 07/09/17  2:13 AM   Result Value Ref Range    WBC 9.4 4.6 - 13.2 K/uL    RBC 4.44 (L) 4.70 - 5.50 M/uL    HGB 14.0 13.0 - 16.0 g/dL    HCT 39.5 36.0 - 48.0 %    MCV 89.0 74.0 - 97.0 FL    MCH 31.5 24.0 - 34.0 PG    MCHC 35.4 31.0 - 37.0 g/dL    RDW 13.2 11.6 - 14.5 %    PLATELET 250 868 - 498 K/uL    MPV 10.0 9.2 - 11.8 FL    NEUTROPHILS 70 40 - 73 %    LYMPHOCYTES 24 21 - 52 %    MONOCYTES 6 3 - 10 %    EOSINOPHILS 0 0 - 5 %    BASOPHILS 0 0 - 2 %    ABS. NEUTROPHILS 6.6 1.8 - 8.0 K/UL    ABS. LYMPHOCYTES 2.2 0.9 - 3.6 K/UL    ABS. MONOCYTES 0.6 0.05 - 1.2 K/UL    ABS. EOSINOPHILS 0.0 0.0 - 0.4 K/UL    ABS.  BASOPHILS 0.0 0.0 - 0.06 K/UL    DF AUTOMATED     METABOLIC PANEL, BASIC    Collection Time: 07/09/17  2:13 AM   Result Value Ref Range    Sodium 133 (L) 136 - 145 mmol/L    Potassium 3.8 3.5 - 5.5 mmol/L    Chloride 98 (L) 100 - 108 mmol/L    CO2 28 21 - 32 mmol/L    Anion gap 7 3.0 - 18 mmol/L    Glucose 108 (H) 74 - 99 mg/dL    BUN 9 7.0 - 18 MG/DL    Creatinine 0.96 0.6 - 1.3 MG/DL    BUN/Creatinine ratio 9 (L) 12 - 20      GFR est AA >60 >60 ml/min/1.73m2    GFR est non-AA >60 >60 ml/min/1.73m2    Calcium 8.3 (L) 8.5 - 10.1 MG/DL   LIPID PANEL    Collection Time: 07/09/17  2:13 AM   Result Value Ref Range    LIPID PROFILE          Cholesterol, total 149 <200 MG/DL    Triglyceride 112 <150 MG/DL    HDL Cholesterol 45 40 - 60 MG/DL    LDL, calculated 81.6 0 - 100 MG/DL    VLDL, calculated 22.4 MG/DL    CHOL/HDL Ratio 3.3 0 - 5.0     CARDIAC PANEL,(CK, CKMB & TROPONIN)    Collection Time: 07/09/17  2:13 AM   Result Value Ref Range     39 - 308 U/L    CK - MB 1.7 <3.6 ng/ml    CK-MB Index 0.8 0.0 - 4.0 %    Troponin-I, Qt. <0.02 0.00 - 0.06 NG/ML   CARDIAC PANEL,(CK, CKMB & TROPONIN)    Collection Time: 07/09/17  7:45 AM   Result Value Ref Range     39 - 308 U/L    CK - MB 1.7 <3.6 ng/ml    CK-MB Index 0.9 0.0 - 4.0 %    Troponin-I, Qt. <0.02 0.00 - 0.06 NG/ML           Negrito Conway MD

## 2017-07-09 NOTE — H&P
History & Physical    Patient: Donavon Monterroso Sr. MRN: [id number]  CSN: [id number]    YOB: 1950  Age: 77 y.o. Sex: male      DOA: 7/8/2017  Primary Care Provider:  Erasto Arizmendi MD      Assessment/Plan     Patient Active Problem List   Diagnosis Code    Acute chest pain R07.9    Diarrhea R19.7    CAD (coronary artery disease) I25.10    Anxiety F41.9    HTN (hypertension) I10    Elevated creatine kinase R74.8       Admit to tele for obs     1chest pain /angina   Will follow ce trend to r/o acs   On aspirin, bbb, plavix ,statin   Nitro prn  O2, morphine   Case  Discussed with Dr. Alice Davila. uds   Echo   Will check lipid     2. HTN, accelerated  Continue home medication. 3 diarrhea   Will give hydration and send for c diff     4. Hx cad /MI    5. Anxiety  Was given ativan in ed   Will continue home meds     DVT : lovenox. ppi proph  CC: chest pain        HPI:     Vishal Blunt is a 77 y.o. male who hx of  MI , anxiety, Cad, HTN  Came to ED due to chest pain at rest today. Located in the middle of chest ,pressure like. Reported having three times diarrhea today, with nausea, no abdomen pain /f/c. He was given nitro and got some relief and he was given ativan in ed also. He looks lethargic due to the ativan, still can answer some questions. Daughters were at the bedside and reported that he lives by himself and no problems for alds. He drinks alcohol, but not daily drinker. ce for one was wnl , ekg no st seg change. Visit Vitals    /63    Pulse 66    Temp 97.3 °F (36.3 °C)    Resp 20    Ht 5' 8\" (1.727 m)    Wt 66.2 kg (146 lb)    SpO2 90%    BMI 22.2 kg/m2      O2 Device: Room air      Past Medical History:   Diagnosis Date    Anxiety     CAD (coronary artery disease)     MI 2010    Hypercholesterolemia     Psychiatric disorder     anxiety       History reviewed. No pertinent surgical history. History reviewed. No pertinent family history.     Social History Social History    Marital status:      Spouse name: N/A    Number of children: N/A    Years of education: N/A     Social History Main Topics    Smoking status: None    Smokeless tobacco: None    Alcohol use None    Drug use: None    Sexual activity: Not Asked     Other Topics Concern    None     Social History Narrative    None       Prior to Admission medications    Medication Sig Start Date End Date Taking? Authorizing Provider   metoprolol succinate (TOPROL XL) 25 mg XL tablet Take 25 mg by mouth daily. Yes Yenifer Hernandez MD   clopidogrel (PLAVIX) 75 mg tab Take 75 mg by mouth Three (3) times a week. Yes Yenifer Hernandez MD   escitalopram oxalate (LEXAPRO) 10 mg tablet Take 10 mg by mouth daily. Yes Yenifer Hernandez MD   tamsulosin (FLOMAX) 0.4 mg capsule Take 0.4 mg by mouth daily. Yes Yenifer Hernandez MD   multivitamin with iron (DAILY MULTI-VITAMINS/IRON) tablet Take 1 Tab by mouth daily. Yes Yenifer Hernandez MD   rOPINIRole (REQUIP) 0.5 mg tablet Take 0.5 mg by mouth nightly. Yes Yenifer Hernandez MD       No Known Allergies    Review of Systems  Gen: No fever, chills, malaise, weight loss/gain. Heent: No headache, rhinorrhea, epistaxis, ear pain, hearing loss, sinus pain, neck pain/stiffness, sore throat. Heart: +chest pain, no palpitations, HERNANDEZ, pnd, or orthopnea. Resp: No cough, hemoptysis, wheezing and shortness of breath. GI: nausea, vomiting, diarrhea, no  constipation, melena or hematochezia. : No urinary obstruction, dysuria or hematuria. Derm: No rash, new skin lesion or pruritis. Musc/skeletal: no bone or joint complains. Vasc: No edema, cyanosis or claudication. Endo: No heat/cold intolerance, no polyuria,polydipsia or polyphagia. Neuro: No unilateral weakness, numbness, tingling. No seizures. Heme: No easy bruising or bleeding.           Physical Exam:     Physical Exam:  Visit Vitals    /63    Pulse 66    Temp 97.3 °F (36.3 °C)    Resp 20    Ht 5' 8\" (1.727 m)  Wt 66.2 kg (146 lb)    SpO2 90%    BMI 22.2 kg/m2      O2 Device: Room air    Temp (24hrs), Av.3 °F (36.3 °C), Min:97.3 °F (36.3 °C), Max:97.3 °F (36.3 °C)             General:  Awake, cooperative, no distress. Lethargic    Head:  Normocephalic, without obvious abnormality, atraumatic. Eyes:  Conjunctivae/corneas clear, sclera anicteric, PERRL, EOMs intact. Nose: Nares normal. No drainage or sinus tenderness. Throat: Lips, mucosa, and tongue normal. .   Neck: Supple, symmetrical, trachea midline, no adenopathy. Lungs:   Clear to auscultation bilaterally. Heart:  Regular rate and rhythm, S1, S2 normal, no murmur, click, rub or gallop. Abdomen: Soft, non-tender. Bowel sounds normal. No masses,  No organomegaly. Extremities: Extremities normal, atraumatic, no cyanosis or edema. Pulses: 2+ and symmetric all extremities. Skin: Skin color-pink, texture, turgor normal. No rashes or lesions. Capillary refill normal    Neurologic: CNII-XII intact. No focal motor or sensory deficit.        Labs Reviewed:    BMP:   Lab Results   Component Value Date/Time     (L) 2017 06:30 PM    K 3.5 2017 06:30 PM    CL 95 (L) 2017 06:30 PM    CO2 24 2017 06:30 PM    AGAP 16 2017 06:30 PM     (H) 2017 06:30 PM    BUN 8 2017 06:30 PM    CREA 1.32 (H) 2017 06:30 PM    GFRAA >60 2017 06:30 PM    GFRNA 54 (L) 2017 06:30 PM     CMP:   Lab Results   Component Value Date/Time     (L) 2017 06:30 PM    K 3.5 2017 06:30 PM    CL 95 (L) 2017 06:30 PM    CO2 24 2017 06:30 PM    AGAP 16 2017 06:30 PM     (H) 2017 06:30 PM    BUN 8 2017 06:30 PM    CREA 1.32 (H) 2017 06:30 PM    GFRAA >60 2017 06:30 PM    GFRNA 54 (L) 2017 06:30 PM    CA 9.4 2017 06:30 PM    MG 1.6 2017 06:30 PM    ALB 4.5 2017 06:30 PM    TP 7.7 2017 06:30 PM    GLOB 3.2 2017 06:30 PM    AGRAT 1.4 07/08/2017 06:30 PM    SGOT 23 07/08/2017 06:30 PM    ALT 26 07/08/2017 06:30 PM     CBC:   Lab Results   Component Value Date/Time    WBC 11.3 07/08/2017 06:30 PM    HGB 16.7 (H) 07/08/2017 06:30 PM    HCT 46.0 07/08/2017 06:30 PM     07/08/2017 06:30 PM     All Cardiac Markers in the last 24 hours:   Lab Results   Component Value Date/Time     07/08/2017 06:30 PM    CKMB 2.4 07/08/2017 06:30 PM    CKND1 0.9 07/08/2017 06:30 PM    TROIQ <0.02 07/08/2017 06:30 PM     Recent Glucose Results:   Lab Results   Component Value Date/Time     (H) 07/08/2017 06:30 PM     ABG: No results found for: PH, PHI, PCO2, PCO2I, PO2, PO2I, HCO3, HCO3I, FIO2, FIO2I  COAGS: No results found for: APTT, PTP, INR  Liver Panel:   Lab Results   Component Value Date/Time    ALB 4.5 07/08/2017 06:30 PM    TP 7.7 07/08/2017 06:30 PM    GLOB 3.2 07/08/2017 06:30 PM    AGRAT 1.4 07/08/2017 06:30 PM    SGOT 23 07/08/2017 06:30 PM    ALT 26 07/08/2017 06:30 PM    AP 67 07/08/2017 06:30 PM     Pancreatic Markers:   Lab Results   Component Value Date/Time    LPSE 180 07/08/2017 06:30 PM       Procedures/imaging: see electronic medical records for all procedures/Xrays and details which were not copied into this note but were reviewed prior to creation of Dickson Harris MD, Internal Medicine     CC: Kait Graf MD

## 2017-07-09 NOTE — ED NOTES
TRANSFER - OUT REPORT:    Verbal report given to Rick Zhang Rn(name) on Dewey Noriega .  being transferred to tele(unit) for routine progression of care       Report consisted of patients Situation, Background, Assessment and   Recommendations(SBAR). Information from the following report(s) SBAR, ED Summary, [facility] and Recent Results was reviewed with the receiving nurse. Lines:       Opportunity for questions and clarification was provided.       Patient transported with:   Monitor  Registered Nurse  Tech

## 2017-07-09 NOTE — ROUTINE PROCESS
TRANSFER - IN REPORT:    Verbal report received from ANGELITA Cowan RN(name) on Nini Neftali Sr.  being received from ED(unit) for routine progression of care      Report consisted of patients Situation, Background, Assessment and   Recommendations(SBAR). Information from the following report(s) SBAR, Kardex, Intake/Output, MAR and Recent Results was reviewed with the receiving nurse. Opportunity for questions and clarification was provided.

## 2017-07-09 NOTE — PROGRESS NOTES
Hospitalist Progress Note-critical care note     Patient: Selena Vadlez Sr. MRN: [id number]  CSN: [id number]    YOB: 1950  Age: 77 y.o. Sex: male    DOA: 7/8/2017 LOS:  LOS: 0 days            Chief complaint:chest pain, htn anxiety     Assessment/Plan         Patient Active Problem List   Diagnosis Code    Acute chest pain R07.9    Diarrhea R19.7    CAD (coronary artery disease) I25.10    Anxiety F41.9    HTN (hypertension) I10    Elevated creatine kinase R74.8     1chest pain /angina   ce  x2 negative   No chest pain over night   On aspirin, bbb, plavix ,statin   Nitro prn  O2, morphine   Case  Discussed with Dr. David Blancas. uds   Echo      2. HTN, accelerated  Continue home medication.     3 diarrhea   Resolved, no bm last night      4. Hx cad /MI     5. Anxiety  Will continue home meds     6 elevated cr  Resolved     Subjective: had a good sleep         Review of systems:    General: No fevers or chills. Cardiovascular: No chest pain or pressure. No palpitations. Pulmonary: No shortness of breath. Gastrointestinal: No nausea, vomiting. Vital signs/Intake and Output:  Visit Vitals    /71 (BP 1 Location: Right arm, BP Patient Position: At rest)    Pulse 64    Temp 97.8 °F (36.6 °C)    Resp 20    Ht 5' 8\" (1.727 m)    Wt 66.2 kg (145 lb 15.1 oz)    SpO2 95%    BMI 22.19 kg/m2     Current Shift:  07/08 1901 - 07/09 0700  In: 240 [P.O.:240]  Out: 300 [Urine:300]  Last three shifts:       Physical Exam:  General: WD, WN. Alert, cooperative, no acute distress    HEENT: NC, Atraumatic. PERRLA, anicteric sclerae. Lungs: CTA Bilaterally. No Wheezing/Rhonchi/Rales. Heart:  Regular  rhythm,  No murmur, No Rubs, No Gallops  Abdomen: Soft, Non distended, Non tender.  +Bowel sounds,   Extremities: No c/c/e  Psych:   Not anxious or agitated. Neurologic:  No acute neurological deficit.              Labs: Results:       Chemistry Recent Labs      07/09/17   2811  07/08/17   1830 GLU  108*  120*   NA  133*  135*   K  3.8  3.5   CL  98*  95*   CO2  28  24   BUN  9  8   CREA  0.96  1.32*   CA  8.3*  9.4   AGAP  7  16   BUCR  9*  6*   AP   --   67   TP   --   7.7   ALB   --   4.5   GLOB   --   3.2   AGRAT   --   1.4      CBC w/Diff Recent Labs      07/09/17   0213  07/08/17   1830   WBC  9.4  11.3   RBC  4.44*  5.21   HGB  14.0  16.7*   HCT  39.5  46.0   PLT  250  274   GRANS  70  71   LYMPH  24  20*   EOS  0  1      Cardiac Enzymes Recent Labs      07/09/17   0213  07/08/17   1830   CPK  210  275   CKND1  0.8  0.9      Coagulation No results for input(s): PTP, INR, APTT in the last 72 hours. No lab exists for component: INREXT    Lipid Panel Lab Results   Component Value Date/Time    Cholesterol, total 149 07/09/2017 02:13 AM    HDL Cholesterol 45 07/09/2017 02:13 AM    LDL, calculated 81.6 07/09/2017 02:13 AM    VLDL, calculated 22.4 07/09/2017 02:13 AM    Triglyceride 112 07/09/2017 02:13 AM    CHOL/HDL Ratio 3.3 07/09/2017 02:13 AM      BNP No results for input(s): BNPP in the last 72 hours.    Liver Enzymes Recent Labs      07/08/17   1830   TP  7.7   ALB  4.5   AP  67   SGOT  23      Thyroid Studies No results found for: T4, T3U, TSH, TSHEXT     Procedures/imaging: see electronic medical records for all procedures/Xrays and details which were not copied into this note but were reviewed prior to creation of Kev Washington MD

## 2017-07-09 NOTE — ROUTINE PROCESS
Bedside and Verbal shift change report given to YAKELIN Gurrola (oncoming nurse) by KAYLEIGH Kebede, RN (offgoing nurse). Report included the following information SBAR, Kardex, Intake/Output, MAR and Recent Results.

## 2017-07-09 NOTE — PROGRESS NOTES
Received patient to unit via stretcher at 2100 accompanied by family members, this RN and ED medic. Patient drowsy after receiving ativan in ED. Admission database completed with patient's daughter. Patient able to awake and answer questions periodically. Later in shift, patient more alert, states that he has had lack of appetite, poor sleep and no interest in doing things since retiring and the passing of his spouse of 30+ years. He stated that his PCP recently started him on a new medication which he thinks is an antidepressant but cannot recall the name. Will contact daughter to obtain name of medication. Patient had uneventful shift, was alert and oriented X4 and able to perform all ADLs by himself after Ativan wore off. No complaints of pain, no stools overnight.

## 2017-07-10 VITALS
RESPIRATION RATE: 18 BRPM | WEIGHT: 138.3 LBS | TEMPERATURE: 97.8 F | SYSTOLIC BLOOD PRESSURE: 155 MMHG | BODY MASS INDEX: 20.96 KG/M2 | HEIGHT: 68 IN | HEART RATE: 56 BPM | OXYGEN SATURATION: 99 % | DIASTOLIC BLOOD PRESSURE: 86 MMHG

## 2017-07-10 LAB
ANION GAP BLD CALC-SCNC: 9 MMOL/L (ref 3–18)
ATRIAL RATE: 74 BPM
BASOPHILS # BLD AUTO: 0 K/UL (ref 0–0.06)
BASOPHILS # BLD: 0 % (ref 0–2)
BUN SERPL-MCNC: 9 MG/DL (ref 7–18)
BUN/CREAT SERPL: 10 (ref 12–20)
CALCIUM SERPL-MCNC: 8.4 MG/DL (ref 8.5–10.1)
CALCULATED P AXIS, ECG09: 65 DEGREES
CALCULATED R AXIS, ECG10: 12 DEGREES
CALCULATED T AXIS, ECG11: 23 DEGREES
CHLORIDE SERPL-SCNC: 99 MMOL/L (ref 100–108)
CO2 SERPL-SCNC: 27 MMOL/L (ref 21–32)
CREAT SERPL-MCNC: 0.94 MG/DL (ref 0.6–1.3)
DIAGNOSIS, 93000: NORMAL
DIFFERENTIAL METHOD BLD: ABNORMAL
EOSINOPHIL # BLD: 0.2 K/UL (ref 0–0.4)
EOSINOPHIL NFR BLD: 2 % (ref 0–5)
ERYTHROCYTE [DISTWIDTH] IN BLOOD BY AUTOMATED COUNT: 13.2 % (ref 11.6–14.5)
GLUCOSE SERPL-MCNC: 101 MG/DL (ref 74–99)
HCT VFR BLD AUTO: 37.7 % (ref 36–48)
HGB BLD-MCNC: 13.5 G/DL (ref 13–16)
LYMPHOCYTES # BLD AUTO: 42 % (ref 21–52)
LYMPHOCYTES # BLD: 3.9 K/UL (ref 0.9–3.6)
MAGNESIUM SERPL-MCNC: 1.9 MG/DL (ref 1.6–2.6)
MCH RBC QN AUTO: 32 PG (ref 24–34)
MCHC RBC AUTO-ENTMCNC: 35.8 G/DL (ref 31–37)
MCV RBC AUTO: 89.3 FL (ref 74–97)
MONOCYTES # BLD: 0.7 K/UL (ref 0.05–1.2)
MONOCYTES NFR BLD AUTO: 8 % (ref 3–10)
NEUTS SEG # BLD: 4.5 K/UL (ref 1.8–8)
NEUTS SEG NFR BLD AUTO: 48 % (ref 40–73)
P-R INTERVAL, ECG05: 172 MS
PLATELET # BLD AUTO: 225 K/UL (ref 135–420)
PMV BLD AUTO: 9.7 FL (ref 9.2–11.8)
POTASSIUM SERPL-SCNC: 3.9 MMOL/L (ref 3.5–5.5)
Q-T INTERVAL, ECG07: 474 MS
QRS DURATION, ECG06: 82 MS
QTC CALCULATION (BEZET), ECG08: 526 MS
RBC # BLD AUTO: 4.22 M/UL (ref 4.7–5.5)
SODIUM SERPL-SCNC: 135 MMOL/L (ref 136–145)
VENTRICULAR RATE, ECG03: 74 BPM
WBC # BLD AUTO: 9.2 K/UL (ref 4.6–13.2)

## 2017-07-10 PROCEDURE — 99218 HC RM OBSERVATION: CPT

## 2017-07-10 PROCEDURE — 83735 ASSAY OF MAGNESIUM: CPT | Performed by: HOSPITALIST

## 2017-07-10 PROCEDURE — 74011250637 HC RX REV CODE- 250/637: Performed by: HOSPITALIST

## 2017-07-10 PROCEDURE — 93306 TTE W/DOPPLER COMPLETE: CPT

## 2017-07-10 PROCEDURE — 74011250636 HC RX REV CODE- 250/636: Performed by: HOSPITALIST

## 2017-07-10 PROCEDURE — 96376 TX/PRO/DX INJ SAME DRUG ADON: CPT

## 2017-07-10 PROCEDURE — 85025 COMPLETE CBC W/AUTO DIFF WBC: CPT | Performed by: HOSPITALIST

## 2017-07-10 PROCEDURE — 80048 BASIC METABOLIC PNL TOTAL CA: CPT | Performed by: HOSPITALIST

## 2017-07-10 PROCEDURE — C9113 INJ PANTOPRAZOLE SODIUM, VIA: HCPCS | Performed by: HOSPITALIST

## 2017-07-10 PROCEDURE — 36415 COLL VENOUS BLD VENIPUNCTURE: CPT | Performed by: HOSPITALIST

## 2017-07-10 PROCEDURE — 74011000250 HC RX REV CODE- 250: Performed by: HOSPITALIST

## 2017-07-10 RX ADMIN — SODIUM CHLORIDE 40 MG: 9 INJECTION INTRAMUSCULAR; INTRAVENOUS; SUBCUTANEOUS at 13:29

## 2017-07-10 RX ADMIN — MULTIPLE VITAMINS W/ MINERALS TAB 1 TABLET: TAB at 13:29

## 2017-07-10 RX ADMIN — TAMSULOSIN HYDROCHLORIDE 0.4 MG: 0.4 CAPSULE ORAL at 13:29

## 2017-07-10 RX ADMIN — ATORVASTATIN CALCIUM 10 MG: 10 TABLET, FILM COATED ORAL at 13:29

## 2017-07-10 RX ADMIN — METOPROLOL SUCCINATE 25 MG: 25 TABLET, EXTENDED RELEASE ORAL at 13:28

## 2017-07-10 RX ADMIN — ESCITALOPRAM OXALATE 10 MG: 10 TABLET ORAL at 13:28

## 2017-07-10 RX ADMIN — ASPIRIN 81 MG: 81 TABLET, COATED ORAL at 13:29

## 2017-07-10 NOTE — PROGRESS NOTES
1915: Assumed patient care, he was alert and oriented to person, place, time and situation. Respiratory status was stable on room air. Vital signs were stable. MEWS score was a one. Patient denied any nausea vomiting dizziness or anxiety. White board was updated and explained. Bed was locked and in lowest position. Call bell, water and personal belongings were within reach. Patient had no questions, comments or concerns after bedside shift report. 0700: Patient had an uneventful shift. Respiratory status, vital signs and MEWS score remained stable. Patient was resting quietly with no signs of distress noted. Bed locked and in lowest position. Call bell water and personal belongings were within reach. Patient had no questions, comments or concerns after bedside shift report. Bedside report given to KAYLEIGH Christina

## 2017-07-10 NOTE — PROGRESS NOTES
Shift Summary- Pt experienced an uneventful shift. Pt did not have any complaints of cp/sob throughout shift. SR on the monitor.

## 2017-07-10 NOTE — DISCHARGE SUMMARY
Discharge Summary    Patient: Tristen Alaniz Sr. MRN: [id number]  CSN: [id number]    YOB: 1950  Age: 77 y.o. Sex: male    DOA: 7/8/2017 LOS:  LOS: 0 days   Discharge Date:      Primary Care Provider:  Gregory Stein MD    Admission Diagnoses: Acute chest pain    Discharge Diagnoses:    Problem List as of 7/10/2017  Never Reviewed          Codes Class Noted - Resolved    * (Principal)Acute chest pain ICD-10-CM: R07.9  ICD-9-CM: 786.50  7/8/2017 - Present        Diarrhea ICD-10-CM: R19.7  ICD-9-CM: 787.91  7/8/2017 - Present        CAD (coronary artery disease) ICD-10-CM: I25.10  ICD-9-CM: 414.00  7/8/2017 - Present        Anxiety ICD-10-CM: F41.9  ICD-9-CM: 300.00  7/8/2017 - Present        HTN (hypertension) ICD-10-CM: I10  ICD-9-CM: 401.9  7/8/2017 - Present        Elevated creatine kinase ICD-10-CM: R74.8  ICD-9-CM: 790.5  7/8/2017 - Present              Discharge Medications:     Current Discharge Medication List      CONTINUE these medications which have NOT CHANGED    Details   omeprazole (PRILOSEC) 20 mg capsule Take 20 mg by mouth daily. metoprolol succinate (TOPROL XL) 25 mg XL tablet Take 25 mg by mouth daily. clopidogrel (PLAVIX) 75 mg tab Take 75 mg by mouth Three (3) times a week. escitalopram oxalate (LEXAPRO) 10 mg tablet Take 10 mg by mouth daily. tamsulosin (FLOMAX) 0.4 mg capsule Take 0.4 mg by mouth daily. multivitamin with iron (DAILY MULTI-VITAMINS/IRON) tablet Take 1 Tab by mouth daily. rOPINIRole (REQUIP) 0.5 mg tablet Take 0.5 mg by mouth nightly. Discharge Condition: Good        Consults: Cardiology      PHYSICAL EXAM   Visit Vitals    /81 (BP 1 Location: Right arm, BP Patient Position: At rest)    Pulse (!) 57    Temp 97.9 °F (36.6 °C)    Resp 18    Ht 5' 8\" (1.727 m)    Wt 62.7 kg (138 lb 4.8 oz)    SpO2 99%    BMI 21.03 kg/m2     General: Awake, cooperative, no acute distress    HEENT: NC, Atraumatic. NEHA, RAFAELMI. Anicteric sclerae. Lungs:  CTA Bilaterally. No Wheezing/Rhonchi/Rales. Heart:  Regular  rhythm,  No murmur, No Rubs, No Gallops  Abdomen: Soft, Non distended, Non tender. +Bowel sounds,   Extremities: No c/c/e  Psych:   Not anxious or agitated. Neurologic:  No acute neurological deficits. Admission HPI : per Dr. Kelsie Taveras. is a 77 y.o. male who hx of  MI , anxiety, Cad, HTN  Came to ED due to chest pain at rest today. Located in the middle of chest ,pressure like. Reported having three times diarrhea today, with nausea, no abdomen pain /f/c. He was given nitro and got some relief and he was given ativan in ed also. He looks lethargic due to the ativan, still can answer some questions. Daughters were at the bedside and reported that he lives by himself and no problems for alds. He drinks alcohol, but not daily drinker. ce for one was wnl , ekg no st seg change. Hospital Course :   Chest pain - in a patient with history of CAD. He was seen and followed by cardiology. His cardiac enzymes x 3 in normal range. ACS ruled out. He had echo done and results are pending. Patient seen by cardiology today, he has no chest pain. He is cleared by cardiology to be discharged. He will need stress test as outpatient.  He can follow up with his cardiologist.       Activity: Activity as tolerated    Diet: Cardiac Diet    Follow-up: PCP, cardiology    Disposition: home    Minutes spent on discharge: 40       Labs: Results:       Chemistry Recent Labs      07/10/17   0323  07/09/17   0213  07/08/17   1830   GLU  101*  108*  120*   NA  135*  133*  135*   K  3.9  3.8  3.5   CL  99*  98*  95*   CO2  27  28  24   BUN  9  9  8   CREA  0.94  0.96  1.32*   CA  8.4*  8.3*  9.4   AGAP  9  7  16   BUCR  10*  9*  6*   AP   --    --   67   TP   --    --   7.7   ALB   --    --   4.5   GLOB   --    --   3.2   AGRAT   --    --   1.4      CBC w/Diff Recent Labs      07/10/17   0323  07/09/17   0997 07/08/17   1830   WBC  9.2  9.4  11.3   RBC  4.22*  4.44*  5.21   HGB  13.5  14.0  16.7*   HCT  37.7  39.5  46.0   PLT  225  250  274   GRANS  48  70  71   LYMPH  42  24  20*   EOS  2  0  1      Cardiac Enzymes Recent Labs      07/09/17   0745  07/09/17   0213   CPK  194  210   CKND1  0.9  0.8      Coagulation No results for input(s): PTP, INR, APTT in the last 72 hours. No lab exists for component: INREXT    Lipid Panel Lab Results   Component Value Date/Time    Cholesterol, total 149 07/09/2017 02:13 AM    HDL Cholesterol 45 07/09/2017 02:13 AM    LDL, calculated 81.6 07/09/2017 02:13 AM    VLDL, calculated 22.4 07/09/2017 02:13 AM    Triglyceride 112 07/09/2017 02:13 AM    CHOL/HDL Ratio 3.3 07/09/2017 02:13 AM      BNP No results for input(s): BNPP in the last 72 hours. Liver Enzymes Recent Labs      07/08/17   1830   TP  7.7   ALB  4.5   AP  67   SGOT  23      Thyroid Studies No results found for: T4, T3U, TSH, TSHEXT         Significant Diagnostic Studies: Xr Chest Port    Result Date: 7/9/2017  EXAM: AP radiograph of the chest INDICATION: Chest pain COMPARISON: None. _______________ FINDINGS: Normal heart size and mediastinal contour. No consolidation, pleural effusion, or pneumothorax. No acute osseous abnormalities. _______________     IMPRESSION: No acute pulmonary findings         No results found for this or any previous visit.         CC: Murphy Duncan MD

## 2017-07-10 NOTE — ROUTINE PROCESS
Dual AVS reviewed with Jodee Ramirez RN. All medications reviewed individually with patient. Opportunities for questions and concerns provided. Patient discharged via (mode of transport ie. Car, ambulance or air transport) car with family member. Patient's arm band appropriately discarded.

## 2017-07-10 NOTE — PROGRESS NOTES
Chart review    Readmission Risk Assessment: Low Risk and MSSP/[facility] Help [facility] patients    RRAT Score: 1 - 12    Initial Assessment:Gerald Galindo . is a 77 y.o. male with PMHX anxiety and MI (7 years ago) presenting to the ED via EMS C/O gradually worsening pressure-like nonradiating sternal CP patient admitted for chest pain    Emergency Contact: Adrienne roberts [phone]    Pertinent Medical Hx: Anxiety, CAD, MI, high cholesterol    PCP/Specialists: Ru Quintero    [facility]:     DME:     Low Risk Care Transition Plan:  1. Evaluate for [facility] or [address] coordination of resources  2. Involve patient/caregiver in assessment, planning, education and implement of intervention. 3. CM daily patient care huddles/interdisciplinary rounds. 4. PCP/Specialist appointment within 7 - 10 days made prior to discharge. 5. Facilitate transportation and logistics for follow-up appointments. 6. Handoff to [address] Nurse Navigator or PCP practice    Care Management Interventions  PCP Verified by CM: Yes  Transition of Care Consult (CM Consult): [facility] with [facility] of care.

## 2017-07-10 NOTE — DISCHARGE INSTRUCTIONS
DISCHARGE SUMMARY from Nurse    The following personal items are in your possession at time of discharge:    Dental Appliances: None  Visual Aid: None     Home Medications: None  Jewelry: Earrings (2 white metal earrings)  Clothing: Undergarments, With patient, Footwear, Pants, Shirt  Other Valuables: None  Personal Items Sent to Safe: none          PATIENT INSTRUCTIONS:    After general anesthesia or intravenous sedation, for 24 hours or while taking prescription Narcotics:  · Limit your activities  · Do not drive and operate hazardous machinery  · Do not make important personal or business decisions  · Do  not drink alcoholic beverages  · If you have not urinated within 8 hours after discharge, please contact your surgeon on call. Report the following to your surgeon:  · Excessive pain, swelling, redness or odor of or around the surgical area  · Temperature over 100.5  · Nausea and vomiting lasting longer than 4 hours or if unable to take medications  · Any signs of decreased circulation or nerve impairment to extremity: change in color, persistent  numbness, tingling, coldness or increase pain  · Any questions        What to do at Home:  Recommended activity: Activity as tolerated. Please follow up with primary care provider and cardiologist.      *  Please give a list of your current medications to your Primary Care Provider. *  Please update this list whenever your medications are discontinued, doses are      changed, or new medications (including over-the-counter products) are added. *  Please carry medication information at all times in case of emergency situations. These are general instructions for a healthy lifestyle:    No smoking/ No tobacco products/ Avoid exposure to second hand smoke    Surgeon General's Warning:  Quitting smoking now greatly reduces serious risk to your health.     Obesity, smoking, and sedentary lifestyle greatly increases your risk for illness    A healthy diet, regular physical exercise & weight monitoring are important for maintaining a healthy lifestyle    You may be retaining fluid if you have a history of heart failure or if you experience any of the following symptoms:  Weight gain of 3 pounds or more overnight or 5 pounds in a week, increased swelling in our hands or feet or shortness of breath while lying flat in bed. Please call your doctor as soon as you notice any of these symptoms; do not wait until your next office visit. Recognize signs and symptoms of STROKE:    F-face looks uneven    A-arms unable to move or move unevenly    S-speech slurred or non-existent    T-time-call 911 as soon as signs and symptoms begin-DO NOT go       Back to bed or wait to see if you get better-TIME IS BRAIN. Warning Signs of HEART ATTACK     Call 911 if you have these symptoms:   Chest discomfort. Most heart attacks involve discomfort in the center of the chest that lasts more than a few minutes, or that goes away and comes back. It can feel like uncomfortable pressure, squeezing, fullness, or pain.  Discomfort in other areas of the upper body. Symptoms can include pain or discomfort in one or both arms, the back, neck, jaw, or stomach.  Shortness of breath with or without chest discomfort.  Other signs may include breaking out in a cold sweat, nausea, or lightheadedness. Don't wait more than five minutes to call 911 - MINUTES MATTER! Fast action can save your life. Calling 911 is almost always the fastest way to get lifesaving treatment. Emergency Medical Services staff can begin treatment when they arrive -- up to an hour sooner than if someone gets to the hospital by car. The discharge information has been reviewed with the patient and caregiver. The patient and caregiver verbalized understanding. Discharge medications reviewed with the patient and guardian and appropriate educational materials and side effects teaching were provided.       Patient armband removed and shredded

## 2017-07-10 NOTE — PROGRESS NOTES
conducted an initial consultation and Spiritual Assessment for Yessenia Jean, who is a 77 y.o.,male. Patients Primary Language is: [place]. According to the patients EMR Christian Affiliation is: No Jehovah's witness. The reason the Patient came to the hospital is:   Patient Active Problem List    Diagnosis Date Noted    Acute chest pain 07/08/2017    Diarrhea 07/08/2017    CAD (coronary artery disease) 07/08/2017    Anxiety 07/08/2017    HTN (hypertension) 07/08/2017    Elevated creatine kinase 07/08/2017        The  provided the following Interventions:  Initiated a relationship of care and support. Explored issues of rao, belief, spirituality and Mosque/ritual needs while hospitalized. Listened empathically. Provided chaplaincy education. Provided information about Spiritual Care Services. Offered prayer and assurance of continued prayers on patients behalf. Chart reviewed. The following outcomes were achieved:  Patient shared limited information about both their medical narrative and spiritual journey/beliefs. Patient processed feeling about current hospitalization. Patient expressed gratitude for pastoral care visit. Assessment:  Patient does not have any Mosque/cultural needs that will affect patients preferences in health care. There are no further spiritual or Mosque issues which require intervention at this time. Plan:  Chaplains will continue to follow and will provide pastoral care on an as needed/requested basis.  recommends bedside caregivers page  on duty if patient shows signs of acute spiritual or emotional distress.       Sister Olesya Baum, [place], [address]  [phone]

## 2017-07-10 NOTE — PROGRESS NOTES
Cardiology Progress Note      7/10/2017 2:59 PM    Admit Date: 7/8/2017    Admit Diagnosis: Acute chest pain      Subjective:     Heather Jules denies chest pain, chest pressure/discomfort, dyspnea, palpitations.     Visit Vitals    /81 (BP 1 Location: Right arm, BP Patient Position: At rest)    Pulse (!) 57    Temp 97.9 °F (36.6 °C)    Resp 18    Ht 5' 8\" (1.727 m)    Wt 62.7 kg (138 lb 4.8 oz)    SpO2 99%    BMI 21.03 kg/m2     Current Facility-Administered Medications   Medication Dose Route Frequency    multivitamin, tx-iron-ca-min (THERA-M w/ IRON) tablet 1 Tab  1 Tab Oral DAILY    0.9% sodium chloride infusion  75 mL/hr IntraVENous CONTINUOUS    acetaminophen (TYLENOL) tablet 650 mg  650 mg Oral Q4H PRN    naloxone (NARCAN) injection 0.4 mg  0.4 mg IntraVENous PRN    diphenhydrAMINE (BENADRYL) injection 12.5 mg  12.5 mg IntraVENous Q4H PRN    ondansetron (ZOFRAN) injection 4 mg  4 mg IntraVENous Q4H PRN    enoxaparin (LOVENOX) injection 40 mg  40 mg SubCUTAneous Q24H    morphine injection 1 mg  1 mg IntraVENous Q3H PRN    metoprolol succinate (TOPROL-XL) XL tablet 25 mg  25 mg Oral DAILY    clopidogrel (PLAVIX) tablet 75 mg  75 mg Oral 3 times weekly    escitalopram oxalate (LEXAPRO) tablet 10 mg  10 mg Oral DAILY    tamsulosin (FLOMAX) capsule 0.4 mg  0.4 mg Oral DAILY    rOPINIRole (REQUIP) tablet 0.5 mg  0.5 mg Oral QHS    pantoprazole (PROTONIX) 40 mg in sodium chloride 0.9 % 10 mL injection  40 mg IntraVENous DAILY    aspirin delayed-release tablet 81 mg  81 mg Oral DAILY    atorvastatin (LIPITOR) tablet 10 mg  10 mg Oral DAILY    nitroglycerin (NITROSTAT) tablet 0.4 mg  0.4 mg SubLINGual PRN         Objective:      Physical Exam:  Visit Vitals    /81 (BP 1 Location: Right arm, BP Patient Position: At rest)    Pulse (!) 57    Temp 97.9 °F (36.6 °C)    Resp 18    Ht 5' 8\" (1.727 m)    Wt 62.7 kg (138 lb 4.8 oz)    SpO2 99%    BMI 21.03 kg/m2     General Appearance:  Well developed, well nourished,alert and oriented x 3, and individual in no acute distress. Ears/Nose/Mouth/Throat:   Hearing grossly normal.         Neck: Supple. Chest:   Lungs clear to auscultation bilaterally. Cardiovascular:  Regular rate and rhythm, S1, S2 normal, no murmur. Abdomen:   Soft, non-tender, bowel sounds are active. Extremities: No edema bilaterally. Skin: Warm and dry. Data Review:   Labs:  Recent Results (from the past 24 hour(s))   MAGNESIUM    Collection Time: 07/10/17  3:23 AM   Result Value Ref Range    Magnesium 1.9 1.6 - 2.6 mg/dL   CBC WITH AUTOMATED DIFF    Collection Time: 07/10/17  3:23 AM   Result Value Ref Range    WBC 9.2 4.6 - 13.2 K/uL    RBC 4.22 (L) 4.70 - 5.50 M/uL    HGB 13.5 13.0 - 16.0 g/dL    HCT 37.7 36.0 - 48.0 %    MCV 89.3 74.0 - 97.0 FL    MCH 32.0 24.0 - 34.0 PG    MCHC 35.8 31.0 - 37.0 g/dL    RDW 13.2 11.6 - 14.5 %    PLATELET 534 371 - 086 K/uL    MPV 9.7 9.2 - 11.8 FL    NEUTROPHILS 48 40 - 73 %    LYMPHOCYTES 42 21 - 52 %    MONOCYTES 8 3 - 10 %    EOSINOPHILS 2 0 - 5 %    BASOPHILS 0 0 - 2 %    ABS. NEUTROPHILS 4.5 1.8 - 8.0 K/UL    ABS. LYMPHOCYTES 3.9 (H) 0.9 - 3.6 K/UL    ABS. MONOCYTES 0.7 0.05 - 1.2 K/UL    ABS. EOSINOPHILS 0.2 0.0 - 0.4 K/UL    ABS.  BASOPHILS 0.0 0.0 - 0.06 K/UL    DF AUTOMATED     METABOLIC PANEL, BASIC    Collection Time: 07/10/17  3:23 AM   Result Value Ref Range    Sodium 135 (L) 136 - 145 mmol/L    Potassium 3.9 3.5 - 5.5 mmol/L    Chloride 99 (L) 100 - 108 mmol/L    CO2 27 21 - 32 mmol/L    Anion gap 9 3.0 - 18 mmol/L    Glucose 101 (H) 74 - 99 mg/dL    BUN 9 7.0 - 18 MG/DL    Creatinine 0.94 0.6 - 1.3 MG/DL    BUN/Creatinine ratio 10 (L) 12 - 20      GFR est AA >60 >60 ml/min/1.73m2    GFR est non-AA >60 >60 ml/min/1.73m2    Calcium 8.4 (L) 8.5 - 10.1 MG/DL       Telemetry: normal sinus rhythm      Assessment:     Principal Problem:    Acute chest pain (7/8/2017)    Active Problems: Diarrhea (7/8/2017)      CAD (coronary artery disease) (7/8/2017)      Anxiety (7/8/2017)      HTN (hypertension) (7/8/2017)      Elevated creatine kinase (7/8/2017)        Plan:     The patient is pain free. Ambulating without difficulty. There is no evidence of ACS. May be discharged. Stress test can be done as an out patient.      Morris Peabody, MD